# Patient Record
(demographics unavailable — no encounter records)

---

## 2025-01-22 NOTE — PHYSICAL EXAM
[Alert and Oriented x3] : oriented to person, place, and time [Normal] : affect was normal and insight and judgment were intact [de-identified] : glasses

## 2025-01-22 NOTE — HISTORY OF PRESENT ILLNESS
[de-identified] : 85 year old female here with spouse for annual exam. No acute concerns. History of rheumatoid arthritis, hyperlipidemia.   Following with rheumatology Dr. Ceron for RA. Current regimen stable and symptoms well controlled. 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    Received flu and COVID vaccines this season. Pneumonia vaccines received 12/2012 and 5/2023.

## 2025-01-22 NOTE — ASSESSMENT
[FreeTextEntry1] : 85 year old female here for annual exam. Labs drawn in office. Vaccines up to date. Reviewed healthy lifestyle habits. Advised to monitor for hearing decreases and obtain audiology evaluation if changes occur.

## 2025-01-22 NOTE — HEALTH RISK ASSESSMENT
[Good] : ~his/her~  mood as  good [Yes] : Yes [4 or more  times a week (4 pts)] : 4 or more  times a week (4 points) [1 or 2 (0 pts)] : 1 or 2 (0 points) [Weekly (3 pts)] : Weekly (3 points) [No] : In the past 12 months have you used drugs other than those required for medical reasons? No [No falls in past year] : Patient reported no falls in the past year [0] : 2) Feeling down, depressed, or hopeless: Not at all (0) [Never] : Never [Patient reported mammogram was normal] : Patient reported mammogram was normal [Patient reported bone density results were normal] : Patient reported bone density results were normal [Patient reported colonoscopy was normal] : Patient reported colonoscopy was normal [HIV test declined] : HIV test declined [Hepatitis C test declined] : Hepatitis C test declined [None] : None [With Significant Other] : lives with significant other [Retired] : retired [] :  [Feels Safe at Home] : Feels safe at home [Fully functional (bathing, dressing, toileting, transferring, walking, feeding)] : Fully functional (bathing, dressing, toileting, transferring, walking, feeding) [Fully functional (using the telephone, shopping, preparing meals, housekeeping, doing laundry, using] : Fully functional and needs no help or supervision to perform IADLs (using the telephone, shopping, preparing meals, housekeeping, doing laundry, using transportation, managing medications and managing finances) [Reports normal functional visual acuity (ie: able to read med bottle)] : Reports normal functional visual acuity [Smoke Detector] : smoke detector [Safety elements used in home] : safety elements used in home [Seat Belt] :  uses seat belt [Sunscreen] : uses sunscreen [Audit-CScore] : 7 [IUY2Oepac] : 0 [Reports changes in hearing] : Reports no changes in hearing [Reports changes in vision] : Reports no changes in vision [Reports changes in dental health] : Reports no changes in dental health [Carbon Monoxide Detector] : no carbon monoxide detector [MammogramDate] : 03/01/2014 [PapSmearComments] : Had hysterectomy 1980 [BoneDensityDate] : 06/01/2021 [ColonoscopyDate] : 05/01/2017

## 2025-02-07 NOTE — PHYSICAL EXAM
[Normal] : affect was normal and insight and judgment were intact [de-identified] : nasal congestion

## 2025-02-07 NOTE — HISTORY OF PRESENT ILLNESS
[FreeTextEntry8] : 84 yo F with RA, presenting for evaluation for one week hx cough, congestion, sneezing. Patient denies muscle aches, fevers, headaches, chest pain, shortness of breath. Took mucinex with some improvement in symptoms. No sick contacts at home at this time.

## 2025-02-07 NOTE — PLAN
[FreeTextEntry1] : 86 yo F with RA, presenting for evaluation for one week hx cough, congestion, sneezing. Patient denies muscle aches, fevers, headaches, chest pain, shortness of breath. Took mucinex with some improvement in symptoms. No sick contacts at home at this time.  Viral URI with cough - likely etiology at this time. Start tessalone perles TID, start flonase. Take tylenol 1000mg BID. Use vicks vaporub PRN. Call back next week if no significant improvement, evaluate next week for possible persistent symptoms due to sinus infection. Exam and hx not concerning for flu, covid or RSV All questions answered

## 2025-02-11 NOTE — PHYSICAL EXAM
[General Appearance - Alert] : alert [General Appearance - In No Acute Distress] : in no acute distress [General Appearance - Well Nourished] : well nourished [General Appearance - Well Developed] : well developed [Sclera] : the sclera and conjunctiva were normal [] : no rash [Motor Exam] : the motor exam was normal [FreeTextEntry1] : There is no active synovitis at this time.

## 2025-02-11 NOTE — HISTORY OF PRESENT ILLNESS
[FreeTextEntry1] : Taking medications as Rx'ed. No progression or recurrence of symptoms. Had recent labs done but LFTs not done. No infection reported since last visit.

## 2025-05-14 NOTE — ASSESSMENT
Pre-procedure checklist reviewed.    MD aware of maximum contrast dose of 118.4 mL.  [FreeTextEntry1] : Discussed symptoms likely due to benign lipoma. Patient with numerous seborrheic keratosis lesions on upper back and advised dermatology evaluation and management to rule out underlying skin disorder. Reviewed possible imaging or surgery referral for excision of upper back soft tissue mass.

## 2025-05-14 NOTE — ASSESSMENT
[FreeTextEntry1] : Discussed symptoms likely due to benign lipoma. Patient with numerous seborrheic keratosis lesions on upper back and advised dermatology evaluation and management to rule out underlying skin disorder. Reviewed possible imaging or surgery referral for excision of upper back soft tissue mass.

## 2025-05-14 NOTE — HISTORY OF PRESENT ILLNESS
[FreeTextEntry8] : 85-year-old female with history of rheumatoid arthritis here for acute concern regarding a lump on her back that is getting irritated. Right medial aspect scapula large nontender lipoma  Present for months, not increasing in size over months  More irritating since near braline  Denies fevers, chills Size Of Lesion In Cm: 0.8

## 2025-05-14 NOTE — HISTORY OF PRESENT ILLNESS
[FreeTextEntry8] : 85-year-old female with history of rheumatoid arthritis here for acute concern regarding a lump on her back that is getting irritated. Right medial aspect scapula large nontender lipoma  Present for months, not increasing in size over months  More irritating since near braline  Denies fevers, chills

## 2025-05-14 NOTE — PHYSICAL EXAM
[Normal] : normal rate, regular rhythm, normal S1 and S2 and no murmur heard [de-identified] : Right medial aspect scapula large nontender lipoma

## 2025-05-14 NOTE — PHYSICAL EXAM
[Normal] : normal rate, regular rhythm, normal S1 and S2 and no murmur heard [de-identified] : Right medial aspect scapula large nontender lipoma

## 2025-05-14 NOTE — PHYSICAL EXAM
[Normal] : normal rate, regular rhythm, normal S1 and S2 and no murmur heard [de-identified] : Right medial aspect scapula large nontender lipoma